# Patient Record
Sex: FEMALE | Race: WHITE
[De-identification: names, ages, dates, MRNs, and addresses within clinical notes are randomized per-mention and may not be internally consistent; named-entity substitution may affect disease eponyms.]

---

## 2017-06-30 NOTE — MM
Reason for exam: history of breast cancer, mastectomy.



History:

Patient has history of breast cancer at age 57.

Mastectomy of the left breast, 2003.



Physical Findings:

Nurse Summary: 1cm nodule in the right breast at 3 o'clock (nurse rm).



MG 3D Diag Mammo W/Cad RT

CC and MLO view(s) were taken of the right breast.

There are scattered fibroglandular densities.  Benign calcifications. There is no 

discrete abnormality including area of concern.



These results were verbally communicated with the patient and result sheet given 

to the patient on 6/30/17.





ASSESSMENT: Benign, BI-RAD 2



RECOMMENDATION:

Follow-up diagnostic mammogram of the right breast in 1 year.

Manage patient on a clinical basis.

## 2019-10-16 NOTE — MM
Reason for exam: additional evaluation requested from prior study.

Last mammogram was performed 2 years and 4 months ago.



History:

Patient is postmenopausal and has history of breast cancer at age 57.

Mastectomy of the left breast, 2003.



Physical Findings:

Nurse did not find any significant physical abnormalities on exam.



MG 3D Diag Mammo W/Cad RT

CC and MLO view(s) were taken of the right breast.

Prior study comparison: June 30, 2017, right breast MG 3d diag mammo w/cad RT.

There are scattered fibroglandular densities.  No significant new findings when 

compared with previous films.



These results were verbally communicated with the patient and result sheet given 

to the patient on 10/16/19.





ASSESSMENT: Benign, BI-RAD 2



RECOMMENDATION:

Follow-up diagnostic mammogram of the right breast in 1 year.

## 2023-01-31 ENCOUNTER — HOSPITAL ENCOUNTER (OUTPATIENT)
Dept: HOSPITAL 47 - LABPAT | Age: 78
Discharge: HOME | End: 2023-01-31
Attending: OBSTETRICS & GYNECOLOGY
Payer: MEDICARE

## 2023-01-31 DIAGNOSIS — E03.9: ICD-10-CM

## 2023-01-31 DIAGNOSIS — N81.4: ICD-10-CM

## 2023-01-31 DIAGNOSIS — Z01.812: Primary | ICD-10-CM

## 2023-01-31 LAB
ANION GAP SERPL CALC-SCNC: 10.8 MMOL/L (ref 10–18)
BASOPHILS # BLD AUTO: 0.04 X 10*3/UL (ref 0–0.1)
BASOPHILS NFR BLD AUTO: 0.7 %
BUN SERPL-SCNC: 10 MG/DL (ref 9–27)
CHLORIDE SERPL-SCNC: 103 MMOL/L (ref 96–109)
CO2 SERPL-SCNC: 28 MMOL/L (ref 20–27.5)
EOSINOPHIL # BLD AUTO: 0.1 X 10*3/UL (ref 0.04–0.35)
EOSINOPHIL NFR BLD AUTO: 1.7 %
ERYTHROCYTE [DISTWIDTH] IN BLOOD BY AUTOMATED COUNT: 4.15 X 10*6/UL (ref 4.1–5.2)
ERYTHROCYTE [DISTWIDTH] IN BLOOD: 12 % (ref 11.5–14.5)
HCT VFR BLD AUTO: 38.8 % (ref 37.2–46.3)
HGB BLD-MCNC: 12.8 G/DL (ref 12–15)
IMM GRANULOCYTES BLD QL AUTO: 0.3 %
LYMPHOCYTES # SPEC AUTO: 1.53 X 10*3/UL (ref 0.9–5)
LYMPHOCYTES NFR SPEC AUTO: 26.1 %
MCH RBC QN AUTO: 30.8 PG (ref 27–32)
MCHC RBC AUTO-ENTMCNC: 33 G/DL (ref 32–37)
MCV RBC AUTO: 93.5 FL (ref 80–97)
MONOCYTES # BLD AUTO: 0.42 X 10*3/UL (ref 0.2–1)
MONOCYTES NFR BLD AUTO: 7.2 %
NEUTROPHILS # BLD AUTO: 3.76 X 10*3/UL (ref 1.8–7.7)
NEUTROPHILS NFR BLD AUTO: 64 %
NRBC BLD AUTO-RTO: 0 /100 WBCS (ref 0–0)
PLATELET # BLD AUTO: 227 X 10*3/UL (ref 140–440)
POTASSIUM SERPL-SCNC: 5.1 MMOL/L (ref 3.5–5.5)
SODIUM SERPL-SCNC: 142 MMOL/L (ref 135–145)
WBC # BLD AUTO: 5.87 X 10*3/UL (ref 4.5–10)

## 2023-01-31 PROCEDURE — 93005 ELECTROCARDIOGRAM TRACING: CPT

## 2023-01-31 PROCEDURE — 80051 ELECTROLYTE PANEL: CPT

## 2023-01-31 PROCEDURE — 85025 COMPLETE CBC W/AUTO DIFF WBC: CPT

## 2023-01-31 PROCEDURE — 84520 ASSAY OF UREA NITROGEN: CPT

## 2023-01-31 PROCEDURE — 82565 ASSAY OF CREATININE: CPT

## 2023-01-31 PROCEDURE — 87086 URINE CULTURE/COLONY COUNT: CPT
